# Patient Record
Sex: MALE | Race: WHITE | NOT HISPANIC OR LATINO | Employment: FULL TIME | URBAN - METROPOLITAN AREA
[De-identification: names, ages, dates, MRNs, and addresses within clinical notes are randomized per-mention and may not be internally consistent; named-entity substitution may affect disease eponyms.]

---

## 2017-06-12 ENCOUNTER — HOSPITAL ENCOUNTER (EMERGENCY)
Facility: MEDICAL CENTER | Age: 27
End: 2017-06-13
Attending: EMERGENCY MEDICINE
Payer: COMMERCIAL

## 2017-06-12 VITALS
DIASTOLIC BLOOD PRESSURE: 76 MMHG | OXYGEN SATURATION: 96 % | BODY MASS INDEX: 29.62 KG/M2 | WEIGHT: 200 LBS | RESPIRATION RATE: 18 BRPM | SYSTOLIC BLOOD PRESSURE: 122 MMHG | TEMPERATURE: 97.7 F | HEART RATE: 68 BPM | HEIGHT: 69 IN

## 2017-06-12 DIAGNOSIS — T40.1X1A HEROIN OVERDOSE, ACCIDENTAL OR UNINTENTIONAL, INITIAL ENCOUNTER (HCC): ICD-10-CM

## 2017-06-12 LAB
ALBUMIN SERPL BCP-MCNC: 4.6 G/DL (ref 3.2–4.9)
ALBUMIN/GLOB SERPL: 1.4 G/DL
ALP SERPL-CCNC: 62 U/L (ref 30–99)
ALT SERPL-CCNC: 19 U/L (ref 2–50)
AMPHET UR QL SCN: NEGATIVE
ANION GAP SERPL CALC-SCNC: 8 MMOL/L (ref 0–11.9)
AST SERPL-CCNC: 27 U/L (ref 12–45)
BARBITURATES UR QL SCN: NEGATIVE
BASOPHILS # BLD AUTO: 0.1 % (ref 0–1.8)
BASOPHILS # BLD: 0.01 K/UL (ref 0–0.12)
BENZODIAZ UR QL SCN: NEGATIVE
BILIRUB SERPL-MCNC: 0.7 MG/DL (ref 0.1–1.5)
BUN SERPL-MCNC: 16 MG/DL (ref 8–22)
BZE UR QL SCN: NEGATIVE
CALCIUM SERPL-MCNC: 9.4 MG/DL (ref 8.5–10.5)
CANNABINOIDS UR QL SCN: NEGATIVE
CHLORIDE SERPL-SCNC: 103 MMOL/L (ref 96–112)
CO2 SERPL-SCNC: 26 MMOL/L (ref 20–33)
CREAT SERPL-MCNC: 0.98 MG/DL (ref 0.5–1.4)
EOSINOPHIL # BLD AUTO: 0.15 K/UL (ref 0–0.51)
EOSINOPHIL NFR BLD: 2 % (ref 0–6.9)
ERYTHROCYTE [DISTWIDTH] IN BLOOD BY AUTOMATED COUNT: 42.5 FL (ref 35.9–50)
ETHANOL BLD-MCNC: 0 G/DL
GFR SERPL CREATININE-BSD FRML MDRD: >60 ML/MIN/1.73 M 2
GLOBULIN SER CALC-MCNC: 3.2 G/DL (ref 1.9–3.5)
GLUCOSE SERPL-MCNC: 132 MG/DL (ref 65–99)
HCT VFR BLD AUTO: 46.8 % (ref 42–52)
HGB BLD-MCNC: 16.1 G/DL (ref 14–18)
IMM GRANULOCYTES # BLD AUTO: 0.02 K/UL (ref 0–0.11)
IMM GRANULOCYTES NFR BLD AUTO: 0.3 % (ref 0–0.9)
LYMPHOCYTES # BLD AUTO: 1.72 K/UL (ref 1–4.8)
LYMPHOCYTES NFR BLD: 23.1 % (ref 22–41)
MCH RBC QN AUTO: 31.5 PG (ref 27–33)
MCHC RBC AUTO-ENTMCNC: 34.4 G/DL (ref 33.7–35.3)
MCV RBC AUTO: 91.6 FL (ref 81.4–97.8)
MDMA UR QL SCN: NEGATIVE
METHADONE UR QL SCN: NEGATIVE
MONOCYTES # BLD AUTO: 0.52 K/UL (ref 0–0.85)
MONOCYTES NFR BLD AUTO: 7 % (ref 0–13.4)
NEUTROPHILS # BLD AUTO: 5.02 K/UL (ref 1.82–7.42)
NEUTROPHILS NFR BLD: 67.5 % (ref 44–72)
NRBC # BLD AUTO: 0 K/UL
NRBC BLD AUTO-RTO: 0 /100 WBC
OPIATES UR QL SCN: POSITIVE
OXYCODONE UR QL SCN: NEGATIVE
PCP UR QL SCN: NEGATIVE
PLATELET # BLD AUTO: 216 K/UL (ref 164–446)
PMV BLD AUTO: 9.6 FL (ref 9–12.9)
POTASSIUM SERPL-SCNC: 4.4 MMOL/L (ref 3.6–5.5)
PROPOXYPH UR QL SCN: NEGATIVE
PROT SERPL-MCNC: 7.8 G/DL (ref 6–8.2)
RBC # BLD AUTO: 5.11 M/UL (ref 4.7–6.1)
SODIUM SERPL-SCNC: 137 MMOL/L (ref 135–145)
WBC # BLD AUTO: 7.4 K/UL (ref 4.8–10.8)

## 2017-06-12 PROCEDURE — 99284 EMERGENCY DEPT VISIT MOD MDM: CPT

## 2017-06-12 PROCEDURE — 80053 COMPREHEN METABOLIC PANEL: CPT

## 2017-06-12 PROCEDURE — 85025 COMPLETE CBC W/AUTO DIFF WBC: CPT

## 2017-06-12 PROCEDURE — 93005 ELECTROCARDIOGRAM TRACING: CPT | Performed by: EMERGENCY MEDICINE

## 2017-06-12 PROCEDURE — 304562 HCHG STAT O2 MASK/CANNULA

## 2017-06-12 PROCEDURE — 96361 HYDRATE IV INFUSION ADD-ON: CPT

## 2017-06-12 PROCEDURE — 96360 HYDRATION IV INFUSION INIT: CPT

## 2017-06-12 PROCEDURE — 80307 DRUG TEST PRSMV CHEM ANLYZR: CPT

## 2017-06-12 PROCEDURE — 700105 HCHG RX REV CODE 258: Performed by: EMERGENCY MEDICINE

## 2017-06-12 RX ORDER — NALOXONE HYDROCHLORIDE 4 MG/.1ML
2 SPRAY NASAL
Qty: 1 EACH | Refills: 1 | Status: SHIPPED | OUTPATIENT
Start: 2017-06-12

## 2017-06-12 RX ORDER — SODIUM CHLORIDE, SODIUM LACTATE, POTASSIUM CHLORIDE, CALCIUM CHLORIDE 600; 310; 30; 20 MG/100ML; MG/100ML; MG/100ML; MG/100ML
1000 INJECTION, SOLUTION INTRAVENOUS ONCE
Status: COMPLETED | OUTPATIENT
Start: 2017-06-12 | End: 2017-06-12

## 2017-06-12 RX ADMIN — SODIUM CHLORIDE, POTASSIUM CHLORIDE, SODIUM LACTATE AND CALCIUM CHLORIDE 1000 ML: 600; 310; 30; 20 INJECTION, SOLUTION INTRAVENOUS at 21:01

## 2017-06-12 ASSESSMENT — PAIN SCALES - GENERAL: PAINLEVEL_OUTOF10: 0

## 2017-06-12 NOTE — ED AVS SNAPSHOT
6/12/2017    Mike Coker  [address] [place] [address]    Dear Mike:    [facility] wants to ensure your discharge home is safe and you or your loved ones have had all of your questions answered regarding your care after you leave the hospital.    Below is a list of resources and contact information should you have any questions regarding your hospital stay, follow-up instructions, or active medical symptoms.    Questions or Concerns Regarding… Contact   Medical Questions Related to Your Discharge  (7 days a week, 8am-5pm) Contact a Nurse Care Coordinator   [phone]   Medical Questions Not Related to Your Discharge  (24 hours a day / 7 days a week)  Contact the Nurse Health Line   [phone]    Medications or Discharge Instructions Refer to your discharge packet   or contact your [facility] Primary Care Provider   [phone]   Follow-up Appointment(s) Schedule your appointment via myTips   or contact Scheduling [phone]   Billing Review your statement via myTips  or contact Billing [phone]   Medical Records Review your records via myTips   or contact Medical Records [phone]     You may receive a telephone call within two days of discharge. This call is to make certain you understand your discharge instructions and have the opportunity to have any questions answered. You can also easily access your medical information, test results and upcoming appointments via the myTips free online health management tool. You can learn more and sign up at [email or web address]. For assistance setting up your myTips account, please call [phone].    Once again, we want to ensure your discharge home is safe and that you have a clear understanding of any next steps in your care. If you have any questions or concerns, please do not hesitate to contact us, we are here for you. Thank you for choosing [facility] for your healthcare needs.    Sincerely,    Your [facility] Healthcare Team

## 2017-06-12 NOTE — ED AVS SNAPSHOT
Home Care Instructions                                                                                                                Mike Coker   MRN: [id number]    Department:  [facility], Emergency Dept   Date of Visit:  6/12/2017            [facility], Emergency Dept    [address]    [place] [address]    Phone:  [phone]      You were seen by     Joselito Lainez D.O.      Your Diagnosis Was     Heroin overdose, accidental or unintentional, initial encounter     T40.1X1A       These are the medications you received during your hospitalization from 06/12/2017 2021 to 06/12/2017 2354     Date/Time Order Dose Route Action    06/12/2017 2101 lactated ringers infusion 1,000 mL Intravenous New Bag      Medication Information     Review all of your home medications and newly ordered medications with your primary doctor and/or pharmacist as soon as possible. Follow medication instructions as directed by your doctor and/or pharmacist.     Please keep your complete medication list with you and share with your physician. Update the information when medications are discontinued, doses are changed, or new medications (including over-the-counter products) are added; and carry medication information at all times in the event of emergency situations.               Medication List      ASK your doctor about these medications        Instructions    Morning Afternoon Evening Bedtime    clonidine 0.1 MG Tabs   Commonly known as:  CATAPRES        Take 1 Tab by mouth 2 times a day.   Dose:  0.1 mg                        gentamicin 0.3 % Soln   Commonly known as:  GARAMYCIN        Place 1 Drop in both eyes every 2 hours.   Dose:  1 Drop                        ondansetron 4 MG Tabs tablet   Commonly known as:  ZOFRAN        Take 1 Tab by mouth every 8 hours as needed for Nausea/Vomiting.   Dose:  4 mg                                Procedures and tests performed during  your visit     CARDIAC MONITORING    CBC WITH DIFFERENTIAL    COMP METABOLIC PANEL    DIAGNOSTIC ALCOHOL    EKG (NOW)    ESTIMATED GFR    IV SALINE LOCK    Pulse Ox    URINE DRUG SCREEN            Patient Information     Patient Information    Following emergency treatment: all patient requiring follow-up care must return either to a private physician or a clinic if your condition worsens before you are able to obtain further medical attention, please return to the emergency room.     Billing Information    At [facility], we work to make the billing process streamlined for our patients.  Our Representatives are here to answer any questions you may have regarding your hospital bill.  If you have insurance coverage and have supplied your insurance information to us, we will submit a claim to your insurer on your behalf.  Should you have any questions regarding your bill, we can be reached online or by phone as follows:  Online: You are able pay your bills online or live chat with our representatives about any billing questions you may have. We are here to help Monday - Friday from 8:00am to 7:30pm and 9:00am - 12:00pm on Saturdays.  Please visit [email or web address]  for more information.   Phone:  [phone] or [phone]    Please note that your emergency physician, surgeon, pathologist, radiologist, anesthesiologist, and other specialists are not employed by [facility] and will therefore bill separately for their services.  Please contact them directly for any questions concerning their bills at the numbers below:     Emergency Physician Services:  [phone]  [facility]:  [phone]  [facility]:  [phone]  [facility]:  [phone]    1. Your final bill may vary from the amount quoted upon discharge if all procedures are not complete at that time, or if your doctor has additional procedures of which we are not aware.  You will receive an additional bill if you return to the Emergency Department at [facility] for suture removal regardless of the facility of which the sutures were placed.     2. Please arrange for settlement of this account at the emergency registration.    3. All self-pay accounts are due in full at the time of treatment.  If you are unable to meet this obligation then payment is expected within 4-5 days.     4. If you have had radiology studies (CT, X-ray, Ultrasound, MRI), you have received a preliminary result during your emergency department visit. Please contact the radiology department [phone] to receive a copy of your final result. Please discuss the Final result with your primary physician or with the follow up physician provided.     Crisis Hotline:  [place] Crisis Hotline:  4-739-HJITUIC or 1-531.771.7721  [place] Crisis Hotline:    1-486.996.1056 or 534-506-8789         ED Discharge Follow Up Questions    1. In order to provide you with very good care, we would like to follow up with a phone call in the next few days.  May we have your permission to contact you?     YES /  NO    2. What is the best phone number to call you? (       )_____-__________    3. What is the best time to call you?      Morning  /  Afternoon  /  Evening                   Patient Signature:  ____________________________________________________________    Date:  ____________________________________________________________

## 2017-06-12 NOTE — ED AVS SNAPSHOT
Tattva Access Code: [email or web address]  Expires: 7/12/2017 11:54 PM    Your email address is not on file at [facility].  Email Addresses are required for you to sign up for Tattva, please contact [phone] to verify your personal information and to provide your email address prior to attempting to register for Tattva.    Mike Coker  [address] [place] [address]    Tattva  A secure, online tool to manage your health information     [facility]’s Tattva® is a secure, online tool that connects you to your personalized health information from the privacy of your home -- day or night - making it very easy for you to manage your healthcare. Once the activation process is completed, you can even access your medical information using the Tattva sergei, which is available for free in the Apple Sergei store or Google Play store.     To learn more about Tattva, visit [email or web address]    There are two levels of access available (as shown below):   My Chart Features  [facility] Primary Care Doctor [facility]  Specialists [facility]  Urgent  Care Non-[facility] Primary Care Doctor   Email your healthcare team securely and privately 24/7 X X X    Manage appointments: schedule your next appointment; view details of past/upcoming appointments X      Request prescription refills. X      View recent personal medical records, including lab and immunizations X X X X   View health record, including health history, allergies, medications X X X X   Read reports about your outpatient visits, procedures, consult and ER notes X X X X   See your discharge summary, which is a recap of your hospital and/or ER visit that includes your diagnosis, lab results, and care plan X X  X     How to register for Tattva:  Once your e-mail address has been verified, follow the following steps to sign up for Tattva.     1. Go to  [email or web address]  2. Click on the Sign Up Now box, which takes you to the New Member Sign Up page.  You will need to provide the following information:  a. Enter your Triggit Access Code exactly as it appears at the top of this page. (You will not need to use this code after you’ve completed the sign-up process. If you do not sign up before the expiration date, you must request a new code.)   b. Enter your date of birth.   c. Enter your home email address.   d. Click Submit, and follow the next screen’s instructions.  3. Create a Money On Mobilet ID. This will be your Triggit login ID and cannot be changed, so think of one that is secure and easy to remember.  4. Create a Triggit password. You can change your password at any time.  5. Enter your Password Reset Question and Answer. This can be used at a later time if you forget your password.   6. Enter your e-mail address. This allows you to receive e-mail notifications when new information is available in Triggit.  7. Click Sign Up. You can now view your health information.    For assistance activating your Triggit account, call [phone]

## 2017-06-13 NOTE — DISCHARGE INSTRUCTIONS
Accidental Overdose  A drug overdose occurs when a chemical substance (drug or medication) is used in amounts large enough to overcome a person. This may result in severe illness or death. This is a type of poisoning. Accidental overdoses of medications or other substances come from a variety of reasons. When this happens accidentally, it is often because the person taking the substance does not know enough about what they have taken. Drugs which commonly cause overdose deaths are alcohol, psychotropic medications (medications which affect the mind), pain medications, illegal drugs (street drugs) such as cocaine and heroin, and multiple drugs taken at the same time. It may result from careless behavior (such as over-indulging at a party). Other causes of overdose may include multiple drug use, a lapse in memory, or drug use after a period of no drug use.   Sometimes overdosing occurs because a person cannot remember if they have taken their medication.   A common unintentional overdose in young children involves multi-vitamins containing iron. Iron is a part of the hemoglobin molecule in blood. It is used to transport oxygen to living cells. When taken in small amounts, iron allows the body to restock hemoglobin. In large amounts, it causes problems in the body. If this overdose is not treated, it can lead to death.  Never take medicines that show signs of tampering or do not seem quite right. Never take medicines in the dark or in poor lighting. Read the label and check each dose of medicine before you take it. When adults are poisoned, it happens most often through carelessness or lack of information. Taking medicines in the dark or taking medicine prescribed for someone else to treat the same type of problem is a dangerous practice.  SYMPTOMS   Symptoms of overdose depend on the medication and amount taken. They can vary from over-activity with stimulant over-dosage, to sleepiness from depressants such as  "alcohol, narcotics and tranquilizers. Confusion, dizziness, nausea and vomiting may be present. If problems are severe enough coma and death may result.  DIAGNOSIS   Diagnosis and management are generally straightforward if the drug is known. Otherwise it is more difficult. At times, certain symptoms and signs exhibited by the patient, or blood tests, can reveal the drug in question.   TREATMENT   In an emergency department, most patients can be treated with supportive measures. Antidotes may be available if there has been an overdose of opioids or benzodiazepines. A rapid improvement will often occur if this is the cause of overdose.  At home or away from medical care:  · There may be no immediate problems or warning signs in children.  · Not everything works well in all cases of poisoning.  · Take immediate action. Poisons may act quickly.  · If you think someone has swallowed medicine or a household product, and the person is unconscious, having seizures (convulsions), or is not breathing, immediately call for an ambulance.  IF a person is conscious and appears to be doing OK but has swallowed a poison:  · Do not wait to see what effect the poison will have. Immediately call a poison control center (listed in the white pages of your telephone book under \"Poison Control\" or inside the front cover with other emergency numbers). Some poison control centers have TTY capability for the deaf. Check with your local center if you or someone in your family requires this service.  · Keep the container so you can read the label on the product for ingredients.  · Describe what, when, and how much was taken and the age and condition of the person poisoned. Inform them if the person is vomiting, choking, drowsy, shows a change in color or temperature of skin, is conscious or unconscious, or is convulsing.  · Do not cause vomiting unless instructed by medical personnel. Do not induce vomiting or force liquids into a person who " is convulsing, unconscious, or very drowsy.  Stay calm and in control.   · Activated charcoal also is sometimes used in certain types of poisoning and you may wish to add a supply to your emergency medicines. It is available without a prescription. Call a poison control center before using this medication.  PREVENTION   Thousands of children die every year from unintentional poisoning. This may be from household chemicals, poisoning from carbon monoxide in a car, taking their parent's medications, or simply taking a few iron pills or vitamins with iron. Poisoning comes from unexpected sources.  · Store medicines out of the sight and reach of children, preferably in a locked cabinet. Do not keep medications in a food cabinet. Always store your medicines in a secure place. Get rid of  medications.  · If you have children living with you or have them as occasional guests, you should have child-resistant caps on your medicine containers. Keep everything out of reach. Child proof your home.  · If you are called to the telephone or to answer the door while you are taking a medicine, take the container with you or put the medicine out of the reach of small children.  · Do not take your medication in front of children. Do not tell your child how good a medication is and how good it is for them. They may get the idea it is more of a treat.  · If you are an adult and have accidentally taken an overdose, you need to consider how this happened and what can be done to prevent it from happening again. If this was from a street drug or alcohol, determine if there is a problem that needs addressing. If you are not sure a problems exists, it is easy to talk to a professional and ask them if they think you have a problem. It is better to handle this problem in this way before it happens again and has a much worse consequence.     This information is not intended to replace advice given to you by your health care provider. Make  sure you discuss any questions you have with your health care provider.     Document Released: 03/03/2006 Document Revised: 01/08/2016 Document Reviewed: 08/09/2010  [employer] Interactive Patient Education ©2016 [employer]    Drug Overdose  Drug overdose happens when you take too much of a drug. An overdose can occur with illegal drugs, prescription drugs, or over-the-counter (OTC) drugs.  The effects of drug overdose can be mild, dangerous, or even deadly.  CAUSES  Drug overdose may be caused by:  · Taking too much of a drug on purpose.  · Taking too much of a drug by accident.  · An error made by a health care provider who prescribes a drug.  · An error made by a pharmacist who fills the prescription order.  Drugs that commonly cause overdose include:  · Mental health drugs.  · Pain medicines.  · Illegal drugs.  · OTC cough and cold medicines.  · Heart medicines.  · Seizure medicines.  RISK FACTORS  Drug overdose is more likely in:  · Children. They may be attracted to colorful pills. Because of children's small size, even a small amount of a drug can be dangerous.  · Elderly people. They may be taking many different drugs. Elderly people may have difficulty reading labels or remembering when they last took their medicine.  The risk of drug overdose is also higher for someone who:  · Takes illegal drugs.  · Takes a drug and drinks alcohol.  · Has a mental health condition.  SYMPTOMS  Signs and symptoms of drug overdose depend on the drug and the amount that was taken. Common danger signs include:  · Behavior changes.  · Sleepiness.  · Slowed breathing.  · Nausea and vomiting.  · Seizures.  · Changes in eye pupil size (very large or very small).  If there are signs of very low blood pressure from a drug overdose (shock), emergency treatment is required. These signs include:  · Cold and clammy skin.  · Pale skin.  · Blue lips.  · Very slow breathing.  · Extreme sleepiness.  · Loss of  consciousness.  DIAGNOSIS  Drug overdose may be diagnosed based on your symptoms. It is important that you tell your health care provider:  · All of the drugs that you have taken.  · When you took the drugs.  · Whether you were drinking alcohol.  Your health care provider will do a physical exam. This exam may include:  · Checking and monitoring your heart rate and rhythm, your temperature, and your blood pressure (vital signs).  · Checking your breathing and oxygen level.  You may also have tests, including:   · Urine tests to check for drugs in your system.  · Blood tests to check for:  ¨ Drugs in your system.  ¨ Signs of an imbalance of your blood minerals (electrolytes).  ¨ Liver damage.  ¨ Kidney damage.  TREATMENT  Supporting your vital signs and your breathing is the first step in treating a drug overdose. Treatment may also include:  · Receiving fluids and electrolytes through an IV tube.  · Having a breathing tube (endotracheal tube) inserted in your airway to help you breathe.  · Having a tube passed through your nose and into your stomach (nasogastric tube) to wash out your stomach.  · Medicines. You may get medicines to:  ¨ Make you vomit.  ¨ Absorb any medicine that is left in your digestive system (activated charcoal).  ¨ Block or reverse the effect of the drug that caused the overdose.  · Having your blood filtered through an artificial kidney machine (hemodialysis). You may need this if your overdose is severe or if you have kidney failure.  · Having ongoing counseling and mental health support if you intentionally overdosed or used an illegal drug.  HOME CARE INSTRUCTIONS  · Take medicines only as directed by your health care provider. Always ask your health care provider to discuss the possible side effects of any new drug that you start taking.  · Keep a list of all of the drugs that you take, including over-the-counter medicines. Bring this list with you to all of your medical visits.  · Read the  drug inserts that come with your medicines.  · Do not use illegal drugs.  · Do not drink alcohol when taking drugs.  · Store all medicines in safety containers that are out of the reach of children.  · Keep the phone number of your local poison control center near your phone or on your cell phone.  · Get help if you are struggling with alcohol or drug use.  · Get help if you are struggling with depression or another mental health problem.  · Keep all follow-up visits as directed by your health care provider. This is important.  SEEK MEDICAL CARE IF:  · Your symptoms return.  · You develop any new signs or symptoms when you are taking medicines.  SEEK IMMEDIATE MEDICAL CARE IF:  · You think that you or someone else may have taken too much of a drug. The hotline of the National Poison Control Center is (957) 491-3030.  · You or someone else is having symptoms of a drug overdose.  · You have serious thoughts about hurting yourself or others.  · You have chest pain.  · You have difficulty breathing.  · You have a loss of consciousness.  Drug overdose is an emergency. Do not wait to see if the symptoms will go away. Get medical help right away. Call your local emergency services (911 in the [place] Do not drive yourself to the hospital.     This information is not intended to replace advice given to you by your health care provider. Make sure you discuss any questions you have with your health care provider.     Document Released: 05/03/2016 Document Reviewed: 12/23/2015  [employer] Interactive Patient Education ©2016 [employer]    Narcotic Overdose  A narcotic overdose is the misuse or overuse of a narcotic drug. A narcotic overdose can make you pass out and stop breathing. If you are not treated right away, this can cause permanent brain damage or stop your heart. Medicine may be given to reverse the effects of an overdose. If so, this medicine may bring on withdrawal symptoms. The symptoms may be abdominal cramps,  throwing up (vomiting), sweating, chills, and nervousness.  Injecting narcotics can cause more problems than just an overdose. AIDS, hepatitis, and other very serious infections are transmitted by sharing needles and syringes. If you decide to quit using, there are medicines which can help you through the withdrawal period. Trying to quit all at once on your own can be uncomfortable, but not life-threatening. Call your caregiver, Narcotics Anonymous, or any drug and alcohol treatment program for further help.      This information is not intended to replace advice given to you by your health care provider. Make sure you discuss any questions you have with your health care provider.     Document Released: 01/25/2006 Document Revised: 01/08/2016 Document Reviewed: 11/19/2010  [employer] Interactive Patient Education ©2016 [employer].

## 2017-06-13 NOTE — DISCHARGE PLANNING
"Alert team note:  Patient says, \"I don't need resources.  I have a treatment center and a therapist.  I just slipped up.\"  Goes to [facility].  Left resources in his chart in case he changed his mind.  Gave report about this to JIM Jones RN.  "

## 2017-06-13 NOTE — ED NOTES
Life skills called about patient and given history. Counselor will come see patient after finishing another evaluation.  Pt continuing to provide a UA.

## 2017-06-13 NOTE — ED NOTES
Pt found by a by-stander in his truck and unresponsive. Pt aroused by EMS and patinet  states shooting up herione  tonight. Pt was given narcan and responded quickly per EMS. Pt denies suicidal ideations. States relapsed tonight.

## 2017-06-13 NOTE — ED PROVIDER NOTES
ED Provider Note    Scribed for Joselito Lainez D.O. by Kaykay Bullard. 6/12/2017  8:44 PM    Primary care provider: Pcp Pt States None   History obtained from: Patient, EMS   History limited by: None     CHIEF COMPLAINT  Chief Complaint   Patient presents with   • Drug Overdose        HPI    Mike Coker is a 27 y.o. male who presents to the ED for heroin overdose. He reports that he used IV heroin tonight. Per patient, he has used marijuana and cocaine in the past but he denies using any other drugs tonight. The patient states that he was in a rehab facility ([facility]) about a month ago and relapsed for the first time tonight. He denies suicidal ideations. Per report, the patient was found unresponsive in his truck. He responded quickly to Narcan. The patient reports shortness of breath, nausea, and hemoptysis. Patient denies fevers, chills, vomiting, diarrhea, abdominal pain, chest pain.       Pt denies any ETOH use tonight.    REVIEW OF SYSTEMS  Please see HPI for pertinent positives/negatives.  All other systems reviewed and are negative. C    PAST MEDICAL HISTORY  Drug abuse    SURGICAL HISTORY  Noncontributory.      SOCIAL HISTORY  Social History     Social History Main Topics   • Smoking status: Noncontributory.     • Alcohol Use: Noncontributory.     • Drug Use: Yes; heroin         FAMILY HISTORY  History reviewed. No pertinent family history.     CURRENT MEDICATIONS  Home Medications     No current facility-administered medications on file prior to encounter.     Current Outpatient Prescriptions on File Prior to Encounter   Medication Sig Dispense Refill   • ondansetron (ZOFRAN) 4 MG Tab tablet Take 1 Tab by mouth every 8 hours as needed for Nausea/Vomiting. 10 Each 1   • clonidine (CATAPRES) 0.1 MG Tab Take 1 Tab by mouth 2 times a day. 20 Tab 0   • gentamicin (GARAMYCIN) 0.3 % SOLN Place 1 Drop in both eyes every 2 hours. 1 Bottle 0               ALLERGIES  Allergies   Allergen  "Reactions   • Penicillins Hives        PHYSICAL EXAM  VITAL SIGNS: /76 mmHg  Pulse 88  Temp(Src) 36.5 °C (97.7 °F)  Resp 18  Ht 1.75 m (5' 8.9\")  Wt 90.719 kg (200 lb)  BMI 29.62 kg/m2  SpO2 98% @MARLYN[600012::@     Pulse ox interpretation: 98% I interpret this pulse ox as normal     Constitutional: Well developed, well nourished, drowsy but in no apparent distress, nontoxic appearance    HENT: No external signs of trauma, normocephalic, bilateral external ears normal, oropharynx moist and clear, nose normal    Eyes: PERRL, conjunctiva without erythema, no discharge, no icterus    Neck: Soft and supple, trachea midline, no stridor, no tenderness, no LAD, no JVD, good ROM    Cardiovascular: Regular rate and rhythm, no murmurs/rubs/gallops, strong distal pulses and good perfusion    Thorax & Lungs: No respiratory distress, CTAB, no chest tenderness    Abdomen: Soft, nontender, nondistended, no guarding, no rebound, normal BS    Back: No CVAT    Extremities: No clubbing, no cyanosis, no edema, no gross deformity, good ROM, no tenderness, intact distal pulses with brisk cap refill    Skin: Warm, dry, no pallor/cyanosis, no rash noted    Lymphatic: No lymphadenopathy noted    Neuro: Oriented times 3, no focal deficits noted    Psychiatric: Cooperative, denies SI, appropriate for clinical situation          DIAGNOSTIC STUDIES / PROCEDURES    EKG  12 Lead EKG obtained at 2055 and interpreted by me:   Rate: 77   Rhythm: Sinus rhythm   Ectopy: None  Intervals: Normal   Axis: Normal   Q Waves: None   QRS: Late anterior R wave progression  ST segments: Normal   T Waves: Normal     Clinical Impression: NSR without acute ST-T changes        LABS  All labs reviewed by me.     Results for orders placed or performed during the hospital encounter of 06/12/17   CBC WITH DIFFERENTIAL   Result Value Ref Range    WBC 7.4 4.8 - 10.8 K/uL    RBC 5.11 4.70 - 6.10 M/uL    Hemoglobin 16.1 14.0 - 18.0 g/dL    Hematocrit 46.8 42.0 - " 52.0 %    MCV 91.6 81.4 - 97.8 fL    MCH 31.5 27.0 - 33.0 pg    MCHC 34.4 33.7 - 35.3 g/dL    RDW 42.5 35.9 - 50.0 fL    Platelet Count 216 164 - 446 K/uL    MPV 9.6 9.0 - 12.9 fL    Neutrophils-Polys 67.50 44.00 - 72.00 %    Lymphocytes 23.10 22.00 - 41.00 %    Monocytes 7.00 0.00 - 13.40 %    Eosinophils 2.00 0.00 - 6.90 %    Basophils 0.10 0.00 - 1.80 %    Immature Granulocytes 0.30 0.00 - 0.90 %    Nucleated RBC 0.00 /100 WBC    Neutrophils (Absolute) 5.02 1.82 - 7.42 K/uL    Lymphs (Absolute) 1.72 1.00 - 4.80 K/uL    Monos (Absolute) 0.52 0.00 - 0.85 K/uL    Eos (Absolute) 0.15 0.00 - 0.51 K/uL    Baso (Absolute) 0.01 0.00 - 0.12 K/uL    Immature Granulocytes (abs) 0.02 0.00 - 0.11 K/uL    NRBC (Absolute) 0.00 K/uL   COMP METABOLIC PANEL   Result Value Ref Range    Sodium 137 135 - 145 mmol/L    Potassium 4.4 3.6 - 5.5 mmol/L    Chloride 103 96 - 112 mmol/L    Co2 26 20 - 33 mmol/L    Anion Gap 8.0 0.0 - 11.9    Glucose 132 (H) 65 - 99 mg/dL    Bun 16 8 - 22 mg/dL    Creatinine 0.98 0.50 - 1.40 mg/dL    Calcium 9.4 8.5 - 10.5 mg/dL    AST(SGOT) 27 12 - 45 U/L    ALT(SGPT) 19 2 - 50 U/L    Alkaline Phosphatase 62 30 - 99 U/L    Total Bilirubin 0.7 0.1 - 1.5 mg/dL    Albumin 4.6 3.2 - 4.9 g/dL    Total Protein 7.8 6.0 - 8.2 g/dL    Globulin 3.2 1.9 - 3.5 g/dL    A-G Ratio 1.4 g/dL   DIAGNOSTIC ALCOHOL   Result Value Ref Range    Diagnostic Alcohol 0.00 0.00 g/dL   URINE DRUG SCREEN   Result Value Ref Range    Amphetamines Urine Negative Negative    Barbiturates Negative Negative    Benzodiazepines Negative Negative    Cocaine Metabolite Negative Negative    Methadone Negative Negative    Ecstasy Negative Negative    Opiates Positive (A) Negative    Oxycodone Negative Negative    Phencyclidine -Pcp Negative Negative    Propoxyphene Negative Negative    Cannabinoid Metab Negative Negative   ESTIMATED GFR   Result Value Ref Range    GFR If African American >60 >60 mL/min/1.73 m 2    GFR If Non African American >60  >60 mL/min/1.73 m 2   EKG (NOW)   Result Value Ref Range    Report       [facility] Emergency Dept.    Test Date:  2017  Pt Name:    KALEIGH MEEK     Department: ER  MRN:        [id number]                      Room:        14  Gender:     M                            Technician: [id number]  :        1990                   Requested By:SHARONDA CRUM  Order #:    [id number]                    Reading MD:    Measurements  Intervals                                Axis  Rate:       77                           P:          36  OH:         184                          QRS:        81  QRSD:       108                          T:          3  QT:         396  QTc:        449    Interpretive Statements  SINUS RHYTHM  LEFT POSTERIOR FASCICULAR BLOCK  No previous ECG available for comparison          COURSE & MEDICAL DECISION MAKING  Nursing notes, VS, PMSFHx reviewed in chart.     Review of past medical records shows the patient was last seen in this ED in September and 2015 for heroin overdose.    Differential diagnoses considered include but are not limited to: CVA/TIA/intracranial hemorrhage, syncope, meningitis/encephalitis, sepsis, hepatic encephalopathy, Sz, UTI, OD/intoxication, hypoglycemia, electrolyte abnormality, endocrine dysfunction, thyroid disorder, dementia     8:49 PM Patient evaluated at bedside. Labs and EKG were ordered. Patient was treated with lactated ringers infusion. He requests a new shirt. I informed the patient that he will remain in the ED for at least several more hours. The plan of care was discussed with the patient and I answered all of his questions at this time. The patient understands and is agreeable with this plan of care.      Pt was monitored in the ED and on recheck is alert, in NAD, nontoxic with stable VS and requesting discharge.  Pt would like Narcan Rx which was given.  Pt declined any detox referral at this time.  Pt will be discharged  home to family members who can monitor and administer Narcan if necessary.  Pt advised to stop using drugs and to seek help.  Pt was given RTED precautions and agrees with plan of care without further questions/concerns.      The patient is referred to a primary physician for blood pressure management, diabetic screening, and for all other preventative health concerns.       FINAL IMPRESSION  1. Heroin overdose, accidental or unintentional, initial encounter           DISPOSITION  Patient will be discharged home in stable condition.       FOLLOW UP  [facility]  [address] [address]  [phone]  Call in 1 day      [facility], Emergency Dept  [address] [facility] [address] [phone]    If symptoms worsen         OUTPATIENT MEDICATIONS  Discharge Medication List as of 6/12/2017 11:54 PM           Kaykay PATEL (Scribe), am scribing for, and in the presence of, Joselito Lainez D.O..    Electronically signed by: Kaykay Bullard (Lorenzo), 6/12/2017    Joselito PATEL D.O. personally performed the services described in this documentation, as scribed by Kaykay Bullard in my presence, and it is both accurate and complete.      Portions of this record were made with voice recognition software and by scribes.  Despite my review, spelling/grammar/context errors may still remain.  Interpretation of this chart should be taken in this context.

## 2017-11-10 LAB — EKG IMPRESSION: NORMAL
